# Patient Record
Sex: FEMALE | Race: WHITE | URBAN - METROPOLITAN AREA
[De-identification: names, ages, dates, MRNs, and addresses within clinical notes are randomized per-mention and may not be internally consistent; named-entity substitution may affect disease eponyms.]

---

## 2017-09-19 ENCOUNTER — TRANSFERRED RECORDS (OUTPATIENT)
Dept: HEALTH INFORMATION MANAGEMENT | Facility: CLINIC | Age: 52
End: 2017-09-19

## 2018-05-04 ENCOUNTER — NURSE TRIAGE (OUTPATIENT)
Dept: NURSING | Facility: CLINIC | Age: 53
End: 2018-05-04

## 2018-05-04 NOTE — TELEPHONE ENCOUNTER
"  Reason for Disposition    Change in shape or appearance of breast    Additional Information    Negative: Chest pain    Negative: Patient is breastfeeding    Negative: Postpartum breast pain and swelling, not breastfeeding    Negative: Small spot, skin growth or mole    Negative: [1] SEVERE breast pain AND [2] fever > 103 F (39.4 C)    Negative: Patient sounds very sick or weak to the triager    Negative: [1] Red area AND [2] fever    Negative: [1] Breast is painful to touch AND [2] fever    Negative: [1] Looks infected (warmth, redness, painful to touch) AND [2] no fever    Negative: [1] Painful rash AND [2] multiple small blisters grouped together (i.e., dermatomal distribution or \"band\" or \"stripe\")    Negative: [1] Cuts, burns, or bruises of breasts AND [2] suspicious history for the injury    Negative: Breast lump    Negative: [1] Nipple discharge AND [2] bloody    Negative: Nipple is inverted (i.e., points inward)  (Exception: long-term physical characteristic, present for many years)    Negative: Dry flaking-peeling skin of nipple    Protocols used: BREAST SYMPTOMS-ADULT-AH    I connected with scheduling to set up a physical with a CNM, as requested. She said she took two home pregnancy tests and one came back positive, the other came back negative. I checked for her, when her last IUD was placed and it was in 2012, so she knows that.  Jill Charles RN-[facility]    "

## 2018-05-25 ENCOUNTER — OFFICE VISIT (OUTPATIENT)
Dept: MIDWIFE SERVICES | Facility: CLINIC | Age: 53
End: 2018-05-25
Payer: COMMERCIAL

## 2018-05-25 VITALS
WEIGHT: 122.2 LBS | SYSTOLIC BLOOD PRESSURE: 103 MMHG | HEIGHT: 66 IN | TEMPERATURE: 98.3 F | DIASTOLIC BLOOD PRESSURE: 66 MMHG | HEART RATE: 97 BPM | BODY MASS INDEX: 19.64 KG/M2

## 2018-05-25 DIAGNOSIS — Z01.419 ENCOUNTER FOR GYNECOLOGICAL EXAMINATION WITHOUT ABNORMAL FINDING: Primary | ICD-10-CM

## 2018-05-25 LAB
ANION GAP SERPL CALCULATED.3IONS-SCNC: 9 MMOL/L (ref 3–14)
BUN SERPL-MCNC: 16 MG/DL (ref 7–30)
CALCIUM SERPL-MCNC: 9 MG/DL (ref 8.5–10.1)
CHLORIDE SERPL-SCNC: 105 MMOL/L (ref 94–109)
CHOLEST SERPL-MCNC: 174 MG/DL
CO2 SERPL-SCNC: 27 MMOL/L (ref 20–32)
CREAT SERPL-MCNC: 0.88 MG/DL (ref 0.52–1.04)
ERYTHROCYTE [DISTWIDTH] IN BLOOD BY AUTOMATED COUNT: 12.7 % (ref 10–15)
GFR SERPL CREATININE-BSD FRML MDRD: 68 ML/MIN/1.7M2
GLUCOSE SERPL-MCNC: 86 MG/DL (ref 70–99)
HCT VFR BLD AUTO: 42.5 % (ref 35–47)
HDLC SERPL-MCNC: 83 MG/DL
HGB BLD-MCNC: 14.3 G/DL (ref 11.7–15.7)
LDLC SERPL CALC-MCNC: 76 MG/DL
MCH RBC QN AUTO: 30.6 PG (ref 26.5–33)
MCHC RBC AUTO-ENTMCNC: 33.6 G/DL (ref 31.5–36.5)
MCV RBC AUTO: 91 FL (ref 78–100)
NONHDLC SERPL-MCNC: 91 MG/DL
PLATELET # BLD AUTO: 182 10E9/L (ref 150–450)
POTASSIUM SERPL-SCNC: 4.1 MMOL/L (ref 3.4–5.3)
RBC # BLD AUTO: 4.68 10E12/L (ref 3.8–5.2)
SODIUM SERPL-SCNC: 141 MMOL/L (ref 133–144)
TRIGL SERPL-MCNC: 74 MG/DL
TSH SERPL DL<=0.005 MIU/L-ACNC: 0.92 MU/L (ref 0.4–4)
WBC # BLD AUTO: 3.7 10E9/L (ref 4–11)

## 2018-05-25 PROCEDURE — 36415 COLL VENOUS BLD VENIPUNCTURE: CPT | Performed by: ADVANCED PRACTICE MIDWIFE

## 2018-05-25 PROCEDURE — 84443 ASSAY THYROID STIM HORMONE: CPT | Performed by: ADVANCED PRACTICE MIDWIFE

## 2018-05-25 PROCEDURE — 99386 PREV VISIT NEW AGE 40-64: CPT | Performed by: ADVANCED PRACTICE MIDWIFE

## 2018-05-25 PROCEDURE — 80048 BASIC METABOLIC PNL TOTAL CA: CPT | Performed by: ADVANCED PRACTICE MIDWIFE

## 2018-05-25 PROCEDURE — 85027 COMPLETE CBC AUTOMATED: CPT | Performed by: ADVANCED PRACTICE MIDWIFE

## 2018-05-25 PROCEDURE — 80061 LIPID PANEL: CPT | Performed by: ADVANCED PRACTICE MIDWIFE

## 2018-05-25 RX ORDER — SUMATRIPTAN 50 MG/1
TABLET, FILM COATED ORAL
COMMUNITY
Start: 2017-09-17

## 2018-05-25 RX ORDER — MULTIPLE VITAMINS W/ MINERALS TAB 9MG-400MCG
1 TAB ORAL DAILY
COMMUNITY

## 2018-05-25 NOTE — MR AVS SNAPSHOT
"              After Visit Summary   2018    Yelena Marti    MRN: [id number]           Patient Information     Date Of Birth          1965        Visit Information        Provider Department      2018 9:00 AM Iris Barillas APRN CNM [facility]        Today's Diagnoses     Encounter for gynecological examination without abnormal finding    -  1       Follow-ups after your visit        Who to contact     If you have questions or need follow up information about today's clinic visit or your schedule please contact [facility] [place] directly at [phone].  Normal or non-critical lab and imaging results will be communicated to you by Fed Playbookhart, letter or phone within 4 business days after the clinic has received the results. If you do not hear from us within 7 days, please contact the clinic through [employer] Playbookhart or phone. If you have a critical or abnormal lab result, we will notify you by phone as soon as possible.  Submit refill requests through [facility] or call your pharmacy and they will forward the refill request to us. Please allow 3 business days for your refill to be completed.          Additional Information About Your Visit        MyChart Information     [facility] lets you send messages to your doctor, view your test results, renew your prescriptions, schedule appointments and more. To sign up, go to [email or web address] . Click on \"Log in\" on the left side of the screen, which will take you to the Welcome page. Then click on \"Sign up Now\" on the right side of the page.     You will be asked to enter the access code listed below, as well as some personal information. Please follow the directions to create your username and password.     Your access code is: [email or web address]  Expires: 2018  1:30 PM     Your access code will  in 90 days. If you need help or a new code, please call your [facility] or [phone].        Care EveryWhere ID     This is your " "Care EveryWhere ID. This could be used by other organizations to access your [facility] medical records  [id number]        Your Vitals Were     Pulse Temperature Height Last Period BMI (Body Mass Index)       97 98.3  F (36.8  C) (Oral) 5' 6.25\" (1.683 m) 05/11/2018 (Approximate) 19.58 kg/m2        Blood Pressure from Last 3 Encounters:   05/25/18 103/66   02/21/12 118/80   12/08/11 118/62    Weight from Last 3 Encounters:   05/25/18 122 lb 3.2 oz (55.4 kg)   02/21/12 127 lb 11.2 oz (57.9 kg)   12/08/11 125 lb 6.4 oz (56.9 kg)              We Performed the Following     Basic metabolic panel  (Ca, Cl, CO2, Creat, Gluc, K, Na, BUN)     CBC with platelets     Lipid Profile (Chol, Trig, HDL, LDL calc)     TSH with free T4 reflex          Today's Medication Changes          These changes are accurate as of 5/25/18  1:30 PM.  If you have any questions, ask your nurse or doctor.               Stop taking these medicines if you haven't already. Please contact your care team if you have questions.     sulfamethoxazole-trimethoprim 800-160 MG per tablet   Commonly known as:  BACTRIM DS   Stopped by:  Iris Barillas APRN CNM           WELLBUTRIN  MG 12 hr tablet   Generic drug:  buPROPion   Stopped by:  Iris Barillas APRN CNM                    Primary Care Provider Office Phone # Fax #    Katharina Mary Woodward [phone]       [facility] [place] [address] [facility] [address]  [place] [address]        Equal Access to Services     [facility] UCLACHELI : Hadii h[place] Bueno, waelleda luqadaha, qaybta kaalmaclaudia fan, krish lin. So [facility] [phone].    ATENCIÓN: Si habla español, tiene a fowler disposición servicios gratuitos de asistencia lingüística. Llame al [phone].    We comply with applicable federal civil rights laws and [place] laws. We do not discriminate on the basis of race, color, national origin, age, disability, sex, sexual orientation, or " gender identity.            Thank you!     Thank you for choosing [facility]  for your care. Our goal is always to provide you with excellent care. Hearing back from our patients is one way we can continue to improve our services. Please take a few minutes to complete the written survey that you may receive in the mail after your visit with us. Thank you!             Your Updated Medication List - Protect others around you: Learn how to safely use, store and throw away your medicines at www.disposemymeds.org.          This list is accurate as of 5/25/18  1:30 PM.  Always use your most recent med list.                   Brand Name Dispense Instructions for use Diagnosis    calcium gluconate 500 MG Tabs tablet      Take 500 mg by mouth 2 times daily    Encounter for gynecological examination without abnormal finding       MIRENA (52 MG) 20 MCG/24HR IUD   Generic drug:  levonorgestrel     one    insert one today in clinic    Other general counseling and advice for contraceptive management, Encounter for insertion or removal of intrauterine contraceptive device       Multi-vitamin Tabs tablet      Take 1 tablet by mouth daily    Encounter for gynecological examination without abnormal finding       SUMAtriptan 50 MG tablet    IMITREX     TAKE 1 TABLET BY MOUTH EVERY 2 HOURS AS NEEDED FOR MIGRAINE. MAXIMUM DOSE 200MG IN 24 HOURS    Encounter for gynecological examination without abnormal finding

## 2018-05-25 NOTE — Clinical Note
Colonoscopy done on this date: 5/2016 (approximately), by this group: [facility], results were normal.  Mammogram done on this date: 9/2017 (approximately), by this group: [facility], results were normal.

## 2018-05-25 NOTE — NURSING NOTE
"Chief Complaint   Patient presents with     Physical     fasting labs. disc IUD.     Health Maintenance     pap smear, mammogram, colonoscopy       Initial /66  Pulse 97  Temp 98.3  F (36.8  C) (Oral)  Ht 5' 6.25\" (1.683 m)  Wt 122 lb 3.2 oz (55.4 kg)  LMP 2018 (Approximate)  BMI 19.58 kg/m2 Estimated body mass index is 19.58 kg/(m^2) as calculated from the following:    Height as of this encounter: 5' 6.25\" (1.683 m).    Weight as of this encounter: 122 lb 3.2 oz (55.4 kg).  BP completed using cuff size: regular        The following HM Due: pap smear      The following patient reported/Care Every where data was sent to:  P ABSTRACT QUALITY INITIATIVES [[id number]]  Colonoscopy done on this date: 2016 (approximately), by this group: [facility], results were normal.   Mammogram done on this date: 2017 (approximately), by this group: [facility], results were normal.       patient has appointment for today    Kim Braun CMA                "

## 2018-05-25 NOTE — PROGRESS NOTES
Yelena is a 53 year old  female who presents for annual exam. Has been unable to come to the [facility] due to insurance reasons. She has been seen by [facility] and [facility] between. Labs are in care everywhere. She is due for a pap smear 2019. History of abnormal pap in her 20's otherwise always normal. She is due for her mammogram 2018, patient aware. She had a colonoscopy done in . She needs annual labs, ordered today.   She discussed a lot about her life today. Seems to be having a hard time with her children. Reports she has no depression or anxiety, only a history and has not been feeling like she has concerns about this. Seems like she could use a therapist to discuss things with. Not interested right now and has resources in the community she can use. Her oldest child is going through transition from female to male. Feels okay about it in general but having a hard time because she feels it is more related to trauma that was not handled well rather than a feeling of being male since birth. She feels like her second oldest, daughter, is doing too many drugs and denying being addicted to pot. She feels she is identifying herself as a drug user and scared she is not recognizing her use as significant. Patient was also concerned about a pregnancy. She had breast tenderness bilaterally for about 3 weeks. Has resolved now. Took a home pregnancy test which was negative but then became positive awhile after. Directions say to read it only at a specific time so inaccurate. She took two other tests that were negative. Discussed getting tested here since she still seems concerned about being pregnant. Unsure if it is covered by insurance, wants to take another one at home if she is still feeling concerned. She had her Mirena IUD in place since 2012. She plans to use it for 7 years. Discussed manufacture life is only 5 years and using it at her own risk at this time although there is some  research to suggest it works for 7 years. She is okay with that. Will have it replaced next year. Due for pap smear next year. No other questions or concerns today.     Menses are irregular and normal lasting 3 days.  Menses flow: normal.  Patient's last menstrual period was 2018 (approximate).. Using IUD for contraception.  She is not currently considering pregnancy.  Besides routine health maintenance, she has no other health concerns today .  GYNECOLOGIC HISTORY:  Menarche: 12    Yelena is sexually active with 1 male partner(s) and is currently in monogamous relationship.    History sexually transmitted infections:No STD history  STI testing offered?  Declined  ALBIN exposure: No  History of abnormal Pap smear: NO - age 30- 65 PAP every 3 years recommended  Family history of breast CA: No  Family history of uterine/ovarian CA: No    Family history of colon CA: No    HEALTH MAINTENANCE:  Cholesterol: (  Cholesterol   Date Value Ref Range Status   2007 161 0 - 200 mg/dL Final     Comment:     LDL Cholesterol is the primary guide to therapy: LDL-cholesterol goal in high   risk patients is <100 mg/dL and in very high risk patients is <70 mg/dL.   The NCEP recommends further evaluation of: patients with cholesterol <200 mg/dL   if additional risk factors are present, cholesterol >240 mg/dL, triglycerides   >150 mg/dL, or HDL <40 mg/dL.    History of abnormal lipids: No  Mammo: 2017 . History of abnormal Mammo: No.  Regular Self Breast Exams: Yes  Calcium/Vitamin D intake: source:  dietary supplement(s) Adequate? No  TSH: (  TSH   Date Value Ref Range Status   2007 1.26 0.4 - 5.0 mU/L Final    )  Pap; (  Lab Results   Component Value Date    PAP NIL 2007    PAP NIL 2006    )    HISTORY:  Obstetric History       T3      L3     SAB0   TAB0   Ectopic0   Multiple0   Live Births3       # Outcome Date GA Lbr Hayes/2nd Weight Sex Delivery Anes PTL Lv   6  01 40w0d   M     PATRICK   5  98 37w0d   F    PATRICK   4  96 39w0d   F    PATRICK   3 Term            2 Term            1 Term                 Past Medical History:   Diagnosis Date     Depressive disorder, not elsewhere classified     prosac 20 mg every other day     Other anxiety states     Lorazapam prn     Past Surgical History:   Procedure Laterality Date     C NONSPECIFIC PROCEDURE  96,98,01    Vagnial delivery x3     Family History   Problem Relation Age of Onset     Circulatory Mother      Thyroid Disease Mother      Alcohol/Drug Mother      Respiratory Mother      CEREBROVASCULAR DISEASE Father      Social History     Social History     Marital status:      Spouse name: N/A     Number of children: 3     Years of education: N/A     Social History Main Topics     Smoking status: Never Smoker     Smokeless tobacco: Never Used     Alcohol use 0.6 oz/week     1 Standard drinks or equivalent per week      Comment: 1/ week or less      Drug use: No     Sexual activity: Yes     Partners: Male     Birth control/ protection: IUD     Other Topics Concern      Service No     Blood Transfusions No     Caffeine Concern No     Occupational Exposure No     Hobby Hazards No     Sleep Concern No     Stress Concern No     Weight Concern No     Special Diet No     Back Care No     Exercise Yes     Bike Helmet No     Seat Belt Yes     Self-Exams Yes     Social History Narrative    Caffeine intake/servings daily - 2-3    Calcium intake/servings daily - 2    Exercise 2 times weekly - describe running, rollerblading    Sunscreen used - Yes    Seatbelts used - Yes    Guns stored in the home - No    Self Breast Exam - Yes    Pap test up to date -  Yes    Eye exam up to date -  Yes    Dental exam up to date -  Yes    DEXA scan up to date -  Not Applicable    Flex Sig/Colonoscopy up to date -  Not Applicable    Mammography up to date -  Yes and today    Immunizations reviewed and up to date - Yes and TD      "Abuse: Current or Past (Physical, Sexual or Emotional) - No    Do you feel safe in your environment - Yes    Do you cope well with stress - Yes    Do you suffer from insomnia - No    Last updated by: Lore Emmanuel  5/22/2007    Lore Emmanuel M.A.       Current Outpatient Prescriptions:      MIRENA 20 MCG/24HR IU IUD, insert one today in clinic, Disp: one, Rfl: 0     SUMAtriptan (IMITREX) 50 MG tablet, TAKE 1 TABLET BY MOUTH EVERY 2 HOURS AS NEEDED FOR MIGRAINE. MAXIMUM DOSE 200MG IN 24 HOURS, Disp: , Rfl:      Allergies   Allergen Reactions     No Known Drug Allergies        Past medical, surgical, social and family history were reviewed and updated in EPIC.    ROS:   C:     NEGATIVE for fever, chills, change in weight  I:       NEGATIVE for worrisome rashes, moles or lesions  E:     NEGATIVE for vision changes or irritation  E/M: NEGATIVE for ear, mouth and throat problems  R:     NEGATIVE for significant cough or SOB  CV:   NEGATIVE for chest pain, palpitations or peripheral edema  GI:     NEGATIVE for nausea, abdominal pain, heartburn, or change in bowel habits  :   NEGATIVE for frequency, dysuria, hematuria, vaginal discharge, or irregular bleeding  M:     NEGATIVE for significant arthralgias or myalgia  N:      NEGATIVE for weakness, dizziness or paresthesias  E:      NEGATIVE for temperature intolerance, skin/hair changes  P:      NEGATIVE for changes in mood or affect.    EXAM:  /66  Pulse 97  Temp 98.3  F (36.8  C) (Oral)  Ht 5' 6.25\" (1.683 m)  Wt 122 lb 3.2 oz (55.4 kg)  LMP 05/11/2018 (Approximate)  BMI 19.58 kg/m2   BMI: Body mass index is 19.58 kg/(m^2).  Constitutional: healthy, alert and no distress  Head: Normocephalic. No masses, lesions, tenderness or abnormalities  Neck: Neck supple. Trachea midline. No adenopathy. Thyroid symmetric, normal size.   Cardiovascular: RRR.   Respiratory: Negative.   Breast: symmetrical and non tender, no masses, nipples everted, no discharge "   Gastrointestinal: Abdomen soft, non-tender, non-distended. No masses, organomegaly.  :  Vulva:  No external lesions, normal female hair distribution, no inguinal adenopathy.    Urethra:  Midline, non-tender, well supported, no discharge  Vagina:  Moist, pink, no abnormal discharge, no lesions  Uterus:  Normal size, anteverted, non-tender, freely mobile  Ovaries:  No masses appreciated, non-tender, mobile  Rectal Exam: deferred  Musculoskeletal: extremities normal  Skin: no suspicious lesions or rashes  Psychiatric: Affect appropriate, cooperative,mentation appears normal.     COUNSELING:   Reviewed preventive health counseling, as reflected in patient instructions   reports that she has never smoked. She has never used smokeless tobacco.    Body mass index is 19.58 kg/(m^2).    FRAX Risk Assessment    ASSESSMENT:  53 year old female with satisfactory annual exam  (Z01.419) Encounter for gynecological examination without abnormal finding  (primary encounter diagnosis)  Plan: SUMAtriptan (IMITREX) 50 MG tablet,         multivitamin, therapeutic with minerals         (MULTI-VITAMIN) TABS tablet, calcium gluconate         500 MG TABS tablet, Lipid Profile (Chol, Trig,         HDL, LDL calc), CBC with platelets, Basic         metabolic panel  (Ca, Cl, CO2, Creat, Gluc, K,         Na, BUN), TSH with free T4 reflex    Follow up for annual exams or PRN.   Iris Barillas CNM

## 2018-05-25 NOTE — LETTER
May 29, 2018      Yelena Marti  [address] [place] [id number]        Dear ,    We are writing to inform you of your test results.    Your test results fall within the expected range(s) or remain unchanged from previous results.  Please continue with current treatment plan.    Resulted Orders   Lipid Profile (Chol, Trig, HDL, LDL calc)   Result Value Ref Range    Cholesterol 174 <200 mg/dL    Triglycerides 74 <150 mg/dL      Comment:      Fasting specimen    HDL Cholesterol 83 >49 mg/dL    LDL Cholesterol Calculated 76 <100 mg/dL      Comment:      Desirable:       <100 mg/dl    Non HDL Cholesterol 91 <130 mg/dL   CBC with platelets   Result Value Ref Range    WBC 3.7 (L) 4.0 - 11.0 10e9/L    RBC Count 4.68 3.8 - 5.2 10e12/L    Hemoglobin 14.3 11.7 - 15.7 g/dL    Hematocrit 42.5 35.0 - 47.0 %    MCV 91 78 - 100 fl    MCH 30.6 26.5 - 33.0 pg    MCHC 33.6 31.5 - 36.5 g/dL    RDW 12.7 10.0 - 15.0 %    Platelet Count 182 150 - 450 10e9/L   Basic metabolic panel  (Ca, Cl, CO2, Creat, Gluc, K, Na, BUN)   Result Value Ref Range    Sodium 141 133 - 144 mmol/L    Potassium 4.1 3.4 - 5.3 mmol/L    Chloride 105 94 - 109 mmol/L    Carbon Dioxide 27 20 - 32 mmol/L    Anion Gap 9 3 - 14 mmol/L    Glucose 86 70 - 99 mg/dL      Comment:      Fasting specimen    Urea Nitrogen 16 7 - 30 mg/dL    Creatinine 0.88 0.52 - 1.04 mg/dL    GFR Estimate 68 >60 mL/min/1.7m2      Comment:      Non  GFR Calc    GFR Estimate If Black 82 >60 mL/min/1.7m2      Comment:       GFR Calc    Calcium 9.0 8.5 - 10.1 mg/dL   TSH with free T4 reflex   Result Value Ref Range    TSH 0.92 0.40 - 4.00 mU/L       If you have any questions or concerns, please call the clinic at the number listed above.       Sincerely,        VJ Finney CNM

## 2018-11-05 ENCOUNTER — TELEPHONE (OUTPATIENT)
Dept: MIDWIFE SERVICES | Facility: CLINIC | Age: 53
End: 2018-11-05

## 2018-11-05 DIAGNOSIS — G43.909 MIGRAINE WITHOUT STATUS MIGRAINOSUS, NOT INTRACTABLE, UNSPECIFIED MIGRAINE TYPE: Primary | ICD-10-CM

## 2018-11-05 RX ORDER — SUMATRIPTAN 100 MG/1
100 TABLET, FILM COATED ORAL
Qty: 9 TABLET | Refills: 0 | Status: SHIPPED | OUTPATIENT
Start: 2018-11-05

## 2018-11-05 NOTE — TELEPHONE ENCOUNTER
Pt is calling to see if you can sent a refill of imitrex for her.  She said it was discussed at her annual with you.  She's miserable, can't get out of bed, and would like something ASAP.  She said that she gets the 100mg tablets and she splits them.  Pharmacy is noted.  Idania Brown RN

## 2018-11-05 NOTE — TELEPHONE ENCOUNTER
I sent in a prescription to get her through this migraine. I recommend she see family practice to follow up on her migraines and future imitrex prescriptions. Thanks, Iris Barillas CNM